# Patient Record
Sex: FEMALE | Race: BLACK OR AFRICAN AMERICAN | NOT HISPANIC OR LATINO
[De-identification: names, ages, dates, MRNs, and addresses within clinical notes are randomized per-mention and may not be internally consistent; named-entity substitution may affect disease eponyms.]

---

## 2017-01-03 ENCOUNTER — OTHER (OUTPATIENT)
Age: 82
End: 2017-01-03

## 2017-01-12 ENCOUNTER — OUTPATIENT (OUTPATIENT)
Dept: OUTPATIENT SERVICES | Facility: HOSPITAL | Age: 82
LOS: 1 days | End: 2017-01-12
Payer: MEDICARE

## 2017-01-12 DIAGNOSIS — R55 SYNCOPE AND COLLAPSE: ICD-10-CM

## 2017-01-12 PROCEDURE — 33282: CPT

## 2017-01-12 NOTE — PROGRESS NOTE ADULT - SUBJECTIVE AND OBJECTIVE BOX
EPS Progress Note    S: 83 yo F with history of HTLD, GERD, Prediabetes, ischemic colitis, syncope and NSVT presented for ILR implant.  Herman chest pain, SOB, palpitations, syncope     MEDICATIONS    Aspirin, metoprolol, norvasc, Atorvastatin           General:  NAD         Chest:  CTA B/L        Cardiac:  RRR      s1/s2      Abdomen:  +BS      Soft      Non-Tender         Extremities:  no edema                                      Assessment/Plan:  83 yo F with history of HTLD, GERD, Prediabetes, ischemic colitis, syncope and NSVT presented for ILR implant.  Will discharge home today

## 2017-01-30 ENCOUNTER — APPOINTMENT (OUTPATIENT)
Dept: UROLOGY | Facility: CLINIC | Age: 82
End: 2017-01-30

## 2017-01-30 VITALS
WEIGHT: 102 LBS | DIASTOLIC BLOOD PRESSURE: 56 MMHG | HEIGHT: 63 IN | TEMPERATURE: 98.6 F | BODY MASS INDEX: 18.07 KG/M2 | HEART RATE: 75 BPM | SYSTOLIC BLOOD PRESSURE: 116 MMHG

## 2017-01-30 DIAGNOSIS — N28.1 CYST OF KIDNEY, ACQUIRED: ICD-10-CM

## 2017-01-30 DIAGNOSIS — R31.0 GROSS HEMATURIA: ICD-10-CM

## 2017-01-31 LAB
ANION GAP SERPL CALC-SCNC: 20 MMOL/L
BUN SERPL-MCNC: 22 MG/DL
CALCIUM SERPL-MCNC: 10.1 MG/DL
CHLORIDE SERPL-SCNC: 102 MMOL/L
CO2 SERPL-SCNC: 23 MMOL/L
CREAT SERPL-MCNC: 1.21 MG/DL
GLUCOSE SERPL-MCNC: 89 MG/DL
POTASSIUM SERPL-SCNC: 4.3 MMOL/L
SODIUM SERPL-SCNC: 145 MMOL/L

## 2017-02-27 ENCOUNTER — APPOINTMENT (OUTPATIENT)
Dept: UROLOGY | Facility: CLINIC | Age: 82
End: 2017-02-27

## 2017-03-15 ENCOUNTER — APPOINTMENT (OUTPATIENT)
Dept: UROLOGY | Facility: CLINIC | Age: 82
End: 2017-03-15

## 2017-04-10 ENCOUNTER — APPOINTMENT (OUTPATIENT)
Dept: HEART AND VASCULAR | Facility: CLINIC | Age: 82
End: 2017-04-10

## 2017-04-10 VITALS
BODY MASS INDEX: 18.25 KG/M2 | DIASTOLIC BLOOD PRESSURE: 68 MMHG | WEIGHT: 103 LBS | HEIGHT: 63 IN | HEART RATE: 67 BPM | SYSTOLIC BLOOD PRESSURE: 129 MMHG

## 2017-04-13 PROCEDURE — 33282: CPT

## 2017-04-13 PROCEDURE — C1764: CPT

## 2017-04-18 DIAGNOSIS — Z86.39 PERSONAL HISTORY OF OTHER ENDOCRINE, NUTRITIONAL AND METABOLIC DISEASE: ICD-10-CM

## 2017-04-18 DIAGNOSIS — Z87.898 PERSONAL HISTORY OF OTHER SPECIFIED CONDITIONS: ICD-10-CM

## 2017-04-18 DIAGNOSIS — I99.9 UNSPECIFIED DISORDER OF CIRCULATORY SYSTEM: ICD-10-CM

## 2017-04-18 DIAGNOSIS — Z82.49 FAMILY HISTORY OF ISCHEMIC HEART DISEASE AND OTHER DISEASES OF THE CIRCULATORY SYSTEM: ICD-10-CM

## 2017-04-18 DIAGNOSIS — Z80.9 FAMILY HISTORY OF MALIGNANT NEOPLASM, UNSPECIFIED: ICD-10-CM

## 2017-04-18 DIAGNOSIS — Z86.79 PERSONAL HISTORY OF OTHER DISEASES OF THE CIRCULATORY SYSTEM: ICD-10-CM

## 2018-04-23 ENCOUNTER — APPOINTMENT (OUTPATIENT)
Dept: HEART AND VASCULAR | Facility: CLINIC | Age: 83
End: 2018-04-23
Payer: MEDICARE

## 2018-04-23 VITALS
DIASTOLIC BLOOD PRESSURE: 66 MMHG | HEIGHT: 63 IN | SYSTOLIC BLOOD PRESSURE: 122 MMHG | HEART RATE: 73 BPM | WEIGHT: 103 LBS | BODY MASS INDEX: 18.25 KG/M2

## 2018-04-23 PROCEDURE — 93285 PRGRMG DEV EVAL SCRMS IP: CPT

## 2018-10-08 ENCOUNTER — APPOINTMENT (OUTPATIENT)
Dept: HEART AND VASCULAR | Facility: CLINIC | Age: 83
End: 2018-10-08

## 2019-04-29 ENCOUNTER — APPOINTMENT (OUTPATIENT)
Dept: HEART AND VASCULAR | Facility: CLINIC | Age: 84
End: 2019-04-29
Payer: MEDICARE

## 2019-04-29 VITALS
HEART RATE: 86 BPM | SYSTOLIC BLOOD PRESSURE: 127 MMHG | WEIGHT: 105 LBS | HEIGHT: 63 IN | DIASTOLIC BLOOD PRESSURE: 65 MMHG | BODY MASS INDEX: 18.61 KG/M2

## 2019-04-29 PROCEDURE — 93285 PRGRMG DEV EVAL SCRMS IP: CPT

## 2019-04-29 RX ORDER — MONTELUKAST 10 MG/1
10 TABLET, FILM COATED ORAL
Refills: 0 | Status: DISCONTINUED | COMMUNITY
End: 2019-04-29

## 2019-04-29 RX ORDER — CALCIUM CITRATE/VITAMIN D3 200MG-6.25
TABLET ORAL
Refills: 0 | Status: ACTIVE | COMMUNITY

## 2019-04-29 RX ORDER — ATORVASTATIN CALCIUM 10 MG/1
10 TABLET, FILM COATED ORAL
Refills: 0 | Status: ACTIVE | COMMUNITY

## 2019-04-29 RX ORDER — MULTIVIT-MIN/FA/LYCOPEN/LUTEIN .4-300-25
TABLET ORAL
Refills: 0 | Status: ACTIVE | COMMUNITY

## 2019-04-30 NOTE — HISTORY OF PRESENT ILLNESS
[FreeTextEntry1] : Ms. Brown is a pleasant 85 year-old female with a past medical history significant for HTLD, Prediabetes, GERD, Ischemic colitis with GIB (7/2016), syncope, palpitations and NSVT during MPS, s/p LOOP recorder implant 1/12/17.  She presents for routine device check today and offers no complaints.\par \par She reported episodes of dizziness during MPS (negative) with Dr. Kirkland.  She reports chest pain that is "always presents", described as nonradiating, mild dull pain that increases with coughing or bending over.  Prior complaint of palpitations that last for a few seconds.\par \par In terms of her syncope, she reports "a handful" of episodes over the last 20 years that always occur in warm weather months.  She has tunnel vision and dizziness prior to LOC.  Has never suffered bodily trauma as a result.  Last episode was at least 1-2 years ago by her report.  She has transferred all of her care to [facility] and is requesting an EP referral there.  \par \par TTE 10/6/16 EF >55%, diastolic LV dysfunction, normal RV size and function, mild LAE, mod AR, mild to mod TR, mild IL, mild pHTN\par Event monitor (Dr. Kirkland) 11/2016: HR range 54-98, accelerated junctional rhythm and ectopy noted

## 2019-04-30 NOTE — DISCUSSION/SUMMARY
[FreeTextEntry1] : Ms. Brown is a pleasant 85 year-old female with a history of symptomatic NSVT during recent MPS with Dr. Kirkland.  MPS negative for ischemia, Echo with normal LVEF and her report of syncope is classic for VVS, unlikely arrhythmogenic cause.  S/P LOOP recorder for heart rhythm assessment. There have been no arrhythmia events noted since implant.  She prefers to transfer all of her medical care to [facility]; referred to Dr. García Cardoza.  She is aware that her remote monitoring will remain with this office until she has established care with Dr. Cardoza and notifies us as such.  Advised to call with any questions or concerns.

## 2019-04-30 NOTE — PHYSICAL EXAM
[General Appearance - Well Developed] : well developed [Normal Appearance] : normal appearance [Well Groomed] : well groomed [General Appearance - Well Nourished] : well nourished [No Deformities] : no deformities [General Appearance - In No Acute Distress] : no acute distress [Nail Clubbing] : no clubbing of the fingernails [Cyanosis, Localized] : no localized cyanosis [Petechial Hemorrhages (___cm)] : no petechial hemorrhages [Normal Conjunctiva] : the conjunctiva exhibited no abnormalities [Eyelids - No Xanthelasma] : the eyelids demonstrated no xanthelasmas [Normal Jugular Venous A Waves Present] : normal jugular venous A waves present [Normal Jugular Venous V Waves Present] : normal jugular venous V waves present [No Jugular Venous Mcmanus A Waves] : no jugular venous mcmanus A waves [5th Left ICS - MCL] : palpated at the 5th LICS in the midclavicular line [Normal] : normal [Normal Rate] : normal [Rhythm Regular] : regular [Normal S1] : normal S1 [Normal S2] : normal S2 [No Pitting Edema] : no pitting edema present [Abnormal Walk] : normal gait [Gait - Sufficient For Exercise Testing] : the gait was sufficient for exercise testing [Skin Color & Pigmentation] : normal skin color and pigmentation [] : no rash [No Venous Stasis] : no venous stasis [Skin Lesions] : no skin lesions [No Skin Ulcers] : no skin ulcer [No Xanthoma] : no  xanthoma was observed [Oriented To Time, Place, And Person] : oriented to person, place, and time [Affect] : the affect was normal [Mood] : the mood was normal [No Anxiety] : not feeling anxious [Right Carotid Bruit] : no bruit heard over the right carotid [Left Carotid Bruit] : no bruit heard over the left carotid

## 2019-04-30 NOTE — REASON FOR VISIT
[Follow-up Device Check] : follow-up device check visit [Initial Evaluation] : an initial evaluation of [Syncope] : syncope [FreeTextEntry1] : NSVT

## 2019-04-30 NOTE — PROCEDURE
[de-identified] : [employer] REVEAL LINQ\par IMPLANTED 1/12/17\par Sensing adequate\par Battery status Good\par 6 Symptom events- EGMs c/w NSR - not sure why she used activator\par 1 Tachy event c/w sinus tach ~ 140-150 bpm